# Patient Record
Sex: FEMALE | Race: BLACK OR AFRICAN AMERICAN | NOT HISPANIC OR LATINO | Employment: FULL TIME | URBAN - METROPOLITAN AREA
[De-identification: names, ages, dates, MRNs, and addresses within clinical notes are randomized per-mention and may not be internally consistent; named-entity substitution may affect disease eponyms.]

---

## 2022-10-13 ENCOUNTER — OFFICE VISIT (OUTPATIENT)
Dept: RHEUMATOLOGY | Facility: CLINIC | Age: 27
End: 2022-10-13
Payer: COMMERCIAL

## 2022-10-13 VITALS
OXYGEN SATURATION: 98 % | RESPIRATION RATE: 18 BRPM | TEMPERATURE: 99 F | WEIGHT: 206.19 LBS | SYSTOLIC BLOOD PRESSURE: 114 MMHG | DIASTOLIC BLOOD PRESSURE: 78 MMHG | HEIGHT: 65 IN | HEART RATE: 90 BPM | BODY MASS INDEX: 34.35 KG/M2

## 2022-10-13 DIAGNOSIS — M79.7 FIBROMYALGIA SYNDROME: ICD-10-CM

## 2022-10-13 DIAGNOSIS — F51.01 PRIMARY INSOMNIA: ICD-10-CM

## 2022-10-13 DIAGNOSIS — M54.89 INFLAMMATORY BACK PAIN: Primary | ICD-10-CM

## 2022-10-13 DIAGNOSIS — M77.9 ENTHESOPATHY: ICD-10-CM

## 2022-10-13 DIAGNOSIS — Q61.3 POLYCYSTIC KIDNEY DISEASE: ICD-10-CM

## 2022-10-13 PROCEDURE — 99999 PR PBB SHADOW E&M-NEW PATIENT-LVL IV: ICD-10-PCS | Mod: PBBFAC,,, | Performed by: INTERNAL MEDICINE

## 2022-10-13 PROCEDURE — 99204 PR OFFICE/OUTPT VISIT, NEW, LEVL IV, 45-59 MIN: ICD-10-PCS | Mod: S$GLB,,, | Performed by: INTERNAL MEDICINE

## 2022-10-13 PROCEDURE — 99204 OFFICE O/P NEW MOD 45 MIN: CPT | Mod: S$GLB,,, | Performed by: INTERNAL MEDICINE

## 2022-10-13 PROCEDURE — 1159F MED LIST DOCD IN RCRD: CPT | Mod: CPTII,S$GLB,, | Performed by: INTERNAL MEDICINE

## 2022-10-13 PROCEDURE — 3074F SYST BP LT 130 MM HG: CPT | Mod: CPTII,S$GLB,, | Performed by: INTERNAL MEDICINE

## 2022-10-13 PROCEDURE — 1159F PR MEDICATION LIST DOCUMENTED IN MEDICAL RECORD: ICD-10-PCS | Mod: CPTII,S$GLB,, | Performed by: INTERNAL MEDICINE

## 2022-10-13 PROCEDURE — 3078F PR MOST RECENT DIASTOLIC BLOOD PRESSURE < 80 MM HG: ICD-10-PCS | Mod: CPTII,S$GLB,, | Performed by: INTERNAL MEDICINE

## 2022-10-13 PROCEDURE — 99999 PR PBB SHADOW E&M-NEW PATIENT-LVL IV: CPT | Mod: PBBFAC,,, | Performed by: INTERNAL MEDICINE

## 2022-10-13 PROCEDURE — 3078F DIAST BP <80 MM HG: CPT | Mod: CPTII,S$GLB,, | Performed by: INTERNAL MEDICINE

## 2022-10-13 PROCEDURE — 3074F PR MOST RECENT SYSTOLIC BLOOD PRESSURE < 130 MM HG: ICD-10-PCS | Mod: CPTII,S$GLB,, | Performed by: INTERNAL MEDICINE

## 2022-10-13 RX ORDER — ERGOCALCIFEROL 1.25 MG/1
50000 CAPSULE ORAL
Qty: 15 CAPSULE | Refills: 5 | Status: SHIPPED | OUTPATIENT
Start: 2022-10-13 | End: 2023-01-13 | Stop reason: SDUPTHER

## 2022-10-13 RX ORDER — EPINEPHRINE 0.3 MG/.3ML
INJECTION SUBCUTANEOUS
COMMUNITY
Start: 2022-07-26

## 2022-10-13 RX ORDER — TIZANIDINE 4 MG/1
4 TABLET ORAL NIGHTLY
Qty: 30 TABLET | Refills: 5 | Status: SHIPPED | OUTPATIENT
Start: 2022-10-13 | End: 2023-01-13 | Stop reason: SDUPTHER

## 2022-10-13 RX ORDER — VENLAFAXINE 75 MG/1
75 TABLET ORAL 2 TIMES DAILY
COMMUNITY
Start: 2022-07-22 | End: 2023-04-21

## 2022-10-13 RX ORDER — ERGOCALCIFEROL 1.25 MG/1
1 CAPSULE ORAL DAILY
COMMUNITY
Start: 2022-06-27 | End: 2022-10-13 | Stop reason: SDUPTHER

## 2022-10-13 RX ORDER — LANOLIN ALCOHOL/MO/W.PET/CERES
100 CREAM (GRAM) TOPICAL DAILY
COMMUNITY

## 2022-10-13 RX ORDER — LUBIPROSTONE 24 UG/1
24 CAPSULE ORAL 2 TIMES DAILY
COMMUNITY
Start: 2022-10-03

## 2022-10-13 RX ORDER — ASCORBIC ACID 500 MG
500 TABLET ORAL DAILY
COMMUNITY
End: 2023-04-21

## 2022-10-13 RX ORDER — HYDROXYCHLOROQUINE SULFATE 200 MG/1
200 TABLET, FILM COATED ORAL 2 TIMES DAILY
Qty: 60 TABLET | Refills: 5 | Status: SHIPPED | OUTPATIENT
Start: 2022-10-13 | End: 2023-01-13 | Stop reason: SDUPTHER

## 2022-10-13 RX ORDER — BUDESONIDE AND FORMOTEROL FUMARATE DIHYDRATE 80; 4.5 UG/1; UG/1
2 AEROSOL RESPIRATORY (INHALATION) 2 TIMES DAILY
COMMUNITY
Start: 2020-07-05

## 2022-10-13 RX ORDER — LABETALOL 100 MG/1
100 TABLET, FILM COATED ORAL 2 TIMES DAILY
COMMUNITY
Start: 2022-07-09

## 2022-10-13 RX ORDER — BACLOFEN 10 MG/1
10 TABLET ORAL DAILY
COMMUNITY
Start: 2022-08-09 | End: 2022-10-13

## 2022-10-13 RX ORDER — PREDNISONE 5 MG/1
5 TABLET ORAL DAILY
Qty: 30 TABLET | Refills: 5 | Status: SHIPPED | OUTPATIENT
Start: 2022-10-13 | End: 2023-04-21

## 2022-10-13 RX ORDER — PREGABALIN 25 MG/1
25 CAPSULE ORAL 2 TIMES DAILY
Qty: 60 CAPSULE | Refills: 5 | Status: SHIPPED | OUTPATIENT
Start: 2022-10-13 | End: 2023-01-13 | Stop reason: SDUPTHER

## 2022-10-13 NOTE — PROGRESS NOTES
"Subjective:       Patient ID: Beverley Melendez is a 26 y.o. female.    Chief Complaint: New patient  (Referral from Dr. Kevin Rojas.. Polyarphropathy..)    Pt  is complaining of joint pain involving his MCP PIP wrist elbow shoulders hips knees and ankles bilaterally.  Is 10/10 in intensity dull in quality and continuous.  It is associated with a morning stiffness lasting for more than 60 minutes. Pt reports having difficulty maintaining a good night of sleep and this has been associated with myalgia of 10/10 in intensity.  This pain is dull continuous and gets worse mainly at night.  It is associated with fatigue.  No fever no chills no others.        Review of Systems   Constitutional:  Negative for appetite change, chills and fever.   HENT:  Negative for congestion, ear pain, mouth sores, nosebleeds and trouble swallowing.    Eyes:  Negative for photophobia and discharge.   Respiratory:  Negative for chest tightness and shortness of breath.    Cardiovascular:  Negative for chest pain.   Gastrointestinal:  Negative for abdominal pain and vomiting.   Endocrine: Negative.    Genitourinary:  Negative for hematuria.   Musculoskeletal:         As per HPI   Skin:  Negative for rash.   Neurological:  Negative for weakness.       Objective:   /78 (BP Location: Left arm, Patient Position: Sitting, BP Method: Large (Automatic))   Pulse 90   Temp 98.7 °F (37.1 °C) (Oral)   Resp 18   Ht 5' 5" (1.651 m)   Wt 93.5 kg (206 lb 3.2 oz)   LMP 09/19/2022 (Exact Date)   SpO2 98%   BMI 34.31 kg/m²      Physical Exam   Constitutional: She is oriented to person, place, and time. She appears well-developed and well-nourished. No distress.   HENT:   Head: Normocephalic and atraumatic.   Right Ear: External ear normal.   Left Ear: External ear normal.   Eyes: Pupils are equal, round, and reactive to light.   Cardiovascular: Normal rate, regular rhythm and normal heart sounds.   Pulmonary/Chest: Breath sounds normal. "   Abdominal: Soft. There is no abdominal tenderness.   Musculoskeletal:      Right shoulder: Tenderness present.      Left shoulder: Tenderness present.      Right elbow: Tenderness present.      Left elbow: Tenderness present.      Right wrist: Tenderness present.      Left wrist: Tenderness present.      Cervical back: Neck supple.      Right hip: Tenderness present.      Left hip: Tenderness present.      Right knee: Tenderness present.      Left knee: Tenderness present.      Right ankle: Tenderness present.      Left ankle: Tenderness present.   Lymphadenopathy:     She has no cervical adenopathy.   Neurological: She is alert and oriented to person, place, and time. She displays normal reflexes. No cranial nerve deficit or sensory deficit. She exhibits normal muscle tone. Coordination normal.   Skin: No rash noted. No erythema.   Vitals reviewed.      Right Side Rheumatological Exam     The patient is tender to palpation of the shoulder, elbow, wrist, knee, 1st PIP, 1st MCP, 2nd PIP, 2nd MCP, 3rd PIP, 3rd MCP, 4th PIP, 4th MCP, 5th PIP, hip, ankle, 1st MTP, 2nd MTP, 3rd MTP, 4th MTP, 5th MTP, 1st toe IP, 2nd toe IP, 3rd toe IP, 4th toe IP and 5th toe IP    Left Side Rheumatological Exam     The patient is tender to palpation of the shoulder, elbow, wrist, knee, 1st PIP, 1st MCP, 2nd PIP, 2nd MCP, 3rd PIP, 3rd MCP, 4th PIP, 4th MCP, 5th PIP, 5th MCP, hip, ankle, 1st MTP, 2nd MTP, 3rd MTP, 4th MTP, 5th MTP, 1st toe IP, 2nd toe IP, 3rd toe IP, 4th toe IP and 5th toe IP.       Completed Fibromyalgia exam 18/18 tender points.  No data to display     Assessment:       1. Inflammatory back pain    2. Fibromyalgia syndrome    3. Primary insomnia    4. Enthesopathy    5. Polycystic kidney disease            Plan:       Problem List Items Addressed This Visit    None  Visit Diagnoses       Inflammatory back pain    -  Primary    Relevant Medications    hydrOXYchloroQUINE (PLAQUENIL) 200 mg tablet    tiZANidine (ZANAFLEX)  4 MG tablet    predniSONE (DELTASONE) 5 MG tablet    pregabalin (LYRICA) 25 MG capsule    ergocalciferol (ERGOCALCIFEROL) 50,000 unit Cap    Other Relevant Orders    HLA B27 Antigen    CBC Auto Differential    Comprehensive Metabolic Panel    CRP, High Sensitivity    Vitamin D    Rheumatoid Quantitative    Cyclic Citrullinated Peptide Antibody, IgG    Antinuclear Ab, HEp-2 Substrate    Hepatitis B Surface Antigen    Hepatitis C Antibody    TSH    T4, Free    Anti-Neutrophilic Cytoplasmic Antibody    POCT Occult Blood Stool    Calprotectin, Stool    FECAL LEUKOCYTES - LACTOFERRIN ON  STOOL    Fibromyalgia syndrome        Relevant Medications    hydrOXYchloroQUINE (PLAQUENIL) 200 mg tablet    tiZANidine (ZANAFLEX) 4 MG tablet    predniSONE (DELTASONE) 5 MG tablet    pregabalin (LYRICA) 25 MG capsule    ergocalciferol (ERGOCALCIFEROL) 50,000 unit Cap    Other Relevant Orders    HLA B27 Antigen    CBC Auto Differential    Comprehensive Metabolic Panel    CRP, High Sensitivity    Vitamin D    Rheumatoid Quantitative    Cyclic Citrullinated Peptide Antibody, IgG    Antinuclear Ab, HEp-2 Substrate    Hepatitis B Surface Antigen    Hepatitis C Antibody    TSH    T4, Free    Anti-Neutrophilic Cytoplasmic Antibody    POCT Occult Blood Stool    Calprotectin, Stool    FECAL LEUKOCYTES - LACTOFERRIN ON  STOOL    Primary insomnia        Relevant Medications    hydrOXYchloroQUINE (PLAQUENIL) 200 mg tablet    tiZANidine (ZANAFLEX) 4 MG tablet    predniSONE (DELTASONE) 5 MG tablet    pregabalin (LYRICA) 25 MG capsule    ergocalciferol (ERGOCALCIFEROL) 50,000 unit Cap    Other Relevant Orders    HLA B27 Antigen    CBC Auto Differential    Comprehensive Metabolic Panel    CRP, High Sensitivity    Vitamin D    Rheumatoid Quantitative    Cyclic Citrullinated Peptide Antibody, IgG    Antinuclear Ab, HEp-2 Substrate    Hepatitis B Surface Antigen    Hepatitis C Antibody    TSH    T4, Free    Anti-Neutrophilic Cytoplasmic Antibody    POCT  Occult Blood Stool    Calprotectin, Stool    FECAL LEUKOCYTES - LACTOFERRIN ON  STOOL    Enthesopathy        Relevant Medications    hydrOXYchloroQUINE (PLAQUENIL) 200 mg tablet    tiZANidine (ZANAFLEX) 4 MG tablet    predniSONE (DELTASONE) 5 MG tablet    pregabalin (LYRICA) 25 MG capsule    ergocalciferol (ERGOCALCIFEROL) 50,000 unit Cap    Other Relevant Orders    HLA B27 Antigen    CBC Auto Differential    Comprehensive Metabolic Panel    CRP, High Sensitivity    Vitamin D    Rheumatoid Quantitative    Cyclic Citrullinated Peptide Antibody, IgG    Antinuclear Ab, HEp-2 Substrate    Hepatitis B Surface Antigen    Hepatitis C Antibody    TSH    T4, Free    Anti-Neutrophilic Cytoplasmic Antibody    POCT Occult Blood Stool    Calprotectin, Stool    FECAL LEUKOCYTES - LACTOFERRIN ON  STOOL    Polycystic kidney disease        Relevant Medications    hydrOXYchloroQUINE (PLAQUENIL) 200 mg tablet    tiZANidine (ZANAFLEX) 4 MG tablet    predniSONE (DELTASONE) 5 MG tablet    pregabalin (LYRICA) 25 MG capsule    ergocalciferol (ERGOCALCIFEROL) 50,000 unit Cap    Other Relevant Orders    HLA B27 Antigen    CBC Auto Differential    Comprehensive Metabolic Panel    CRP, High Sensitivity    Vitamin D    Rheumatoid Quantitative    Cyclic Citrullinated Peptide Antibody, IgG    Antinuclear Ab, HEp-2 Substrate    Hepatitis B Surface Antigen    Hepatitis C Antibody    TSH    T4, Free    Anti-Neutrophilic Cytoplasmic Antibody    POCT Occult Blood Stool    Calprotectin, Stool    FECAL LEUKOCYTES - LACTOFERRIN ON  STOOL

## 2022-10-14 ENCOUNTER — APPOINTMENT (OUTPATIENT)
Dept: LAB | Facility: HOSPITAL | Age: 27
End: 2022-10-14
Attending: INTERNAL MEDICINE
Payer: COMMERCIAL

## 2022-10-16 LAB
FECAL LEUKOCYTE (OHS): NEGATIVE
LEUKO NEG CONT (OHS): NEGATIVE
LEUKO POS CONT (OHS): POSITIVE

## 2022-10-18 LAB — CALPROTECTIN STL-MCNT: <50 MCG/G

## 2023-01-13 ENCOUNTER — OFFICE VISIT (OUTPATIENT)
Dept: RHEUMATOLOGY | Facility: CLINIC | Age: 28
End: 2023-01-13
Payer: MEDICAID

## 2023-01-13 VITALS
HEART RATE: 84 BPM | RESPIRATION RATE: 18 BRPM | WEIGHT: 208.38 LBS | TEMPERATURE: 98 F | DIASTOLIC BLOOD PRESSURE: 70 MMHG | SYSTOLIC BLOOD PRESSURE: 130 MMHG | HEIGHT: 65 IN | OXYGEN SATURATION: 97 % | BODY MASS INDEX: 34.72 KG/M2

## 2023-01-13 DIAGNOSIS — M77.9 ENTHESOPATHY: ICD-10-CM

## 2023-01-13 DIAGNOSIS — Q61.3 POLYCYSTIC KIDNEY DISEASE: ICD-10-CM

## 2023-01-13 DIAGNOSIS — M06.00 SERONEGATIVE RHEUMATOID ARTHRITIS: Primary | ICD-10-CM

## 2023-01-13 DIAGNOSIS — M79.7 FIBROMYALGIA SYNDROME: ICD-10-CM

## 2023-01-13 DIAGNOSIS — M54.89 INFLAMMATORY BACK PAIN: ICD-10-CM

## 2023-01-13 DIAGNOSIS — F51.01 PRIMARY INSOMNIA: ICD-10-CM

## 2023-01-13 PROCEDURE — 3008F BODY MASS INDEX DOCD: CPT | Mod: CPTII,,, | Performed by: INTERNAL MEDICINE

## 2023-01-13 PROCEDURE — 1159F PR MEDICATION LIST DOCUMENTED IN MEDICAL RECORD: ICD-10-PCS | Mod: CPTII,,, | Performed by: INTERNAL MEDICINE

## 2023-01-13 PROCEDURE — 3075F SYST BP GE 130 - 139MM HG: CPT | Mod: CPTII,,, | Performed by: INTERNAL MEDICINE

## 2023-01-13 PROCEDURE — 3078F DIAST BP <80 MM HG: CPT | Mod: CPTII,,, | Performed by: INTERNAL MEDICINE

## 2023-01-13 PROCEDURE — 99214 OFFICE O/P EST MOD 30 MIN: CPT | Mod: PBBFAC | Performed by: INTERNAL MEDICINE

## 2023-01-13 PROCEDURE — 99999 PR PBB SHADOW E&M-EST. PATIENT-LVL IV: ICD-10-PCS | Mod: PBBFAC,,, | Performed by: INTERNAL MEDICINE

## 2023-01-13 PROCEDURE — 1159F MED LIST DOCD IN RCRD: CPT | Mod: CPTII,,, | Performed by: INTERNAL MEDICINE

## 2023-01-13 PROCEDURE — 99999 PR PBB SHADOW E&M-EST. PATIENT-LVL IV: CPT | Mod: PBBFAC,,, | Performed by: INTERNAL MEDICINE

## 2023-01-13 PROCEDURE — 99214 PR OFFICE/OUTPT VISIT, EST, LEVL IV, 30-39 MIN: ICD-10-PCS | Mod: S$PBB,,, | Performed by: INTERNAL MEDICINE

## 2023-01-13 PROCEDURE — 99214 OFFICE O/P EST MOD 30 MIN: CPT | Mod: S$PBB,,, | Performed by: INTERNAL MEDICINE

## 2023-01-13 PROCEDURE — 3078F PR MOST RECENT DIASTOLIC BLOOD PRESSURE < 80 MM HG: ICD-10-PCS | Mod: CPTII,,, | Performed by: INTERNAL MEDICINE

## 2023-01-13 PROCEDURE — 3008F PR BODY MASS INDEX (BMI) DOCUMENTED: ICD-10-PCS | Mod: CPTII,,, | Performed by: INTERNAL MEDICINE

## 2023-01-13 PROCEDURE — 3075F PR MOST RECENT SYSTOLIC BLOOD PRESS GE 130-139MM HG: ICD-10-PCS | Mod: CPTII,,, | Performed by: INTERNAL MEDICINE

## 2023-01-13 RX ORDER — HYDROXYCHLOROQUINE SULFATE 200 MG/1
200 TABLET, FILM COATED ORAL 2 TIMES DAILY
Qty: 60 TABLET | Refills: 5 | Status: SHIPPED | OUTPATIENT
Start: 2023-01-13 | End: 2023-04-21 | Stop reason: SDUPTHER

## 2023-01-13 RX ORDER — PREGABALIN 50 MG/1
50 CAPSULE ORAL 2 TIMES DAILY
Qty: 60 CAPSULE | Refills: 5 | Status: SHIPPED | OUTPATIENT
Start: 2023-01-13 | End: 2023-04-21 | Stop reason: SDUPTHER

## 2023-01-13 RX ORDER — ERGOCALCIFEROL 1.25 MG/1
50000 CAPSULE ORAL
Qty: 15 CAPSULE | Refills: 5 | Status: SHIPPED | OUTPATIENT
Start: 2023-01-13 | End: 2023-04-21 | Stop reason: SDUPTHER

## 2023-01-13 RX ORDER — TIZANIDINE 4 MG/1
8 TABLET ORAL NIGHTLY
Qty: 60 TABLET | Refills: 5 | Status: SHIPPED | OUTPATIENT
Start: 2023-01-13 | End: 2023-04-21 | Stop reason: SDUPTHER

## 2023-01-13 NOTE — PROGRESS NOTES
"Subjective:       Patient ID: Beverley Melendez is a 27 y.o. female.    Chief Complaint: Follow-up (Bilateral leg pain )    The patient is complaining of joint pain involving the MCP PIP wrist elbow shoulders hips knees and ankles bilaterally.  The pain is 7/10 in intensity dull in quality and continuous.  That is associated with a morning stiffness lasting for more than 60 minutes.  Is also having difficulty maintaining a good night of sleep.  This has been associated with myalgias.  Muscle aches are 7/10 in intensity dull in quality and continuous.  They are associated with fatigue.  No fever no chills no others.    I CHECKED LABS TODAY DURING THE VISIT.       Review of Systems   Constitutional:  Negative for appetite change, chills and fever.   HENT:  Negative for congestion, ear pain, mouth sores, nosebleeds and trouble swallowing.    Eyes:  Negative for photophobia and discharge.   Respiratory:  Negative for chest tightness and shortness of breath.    Cardiovascular:  Negative for chest pain.   Gastrointestinal:  Negative for abdominal pain and vomiting.   Endocrine: Negative.    Genitourinary:  Negative for hematuria.   Musculoskeletal:         As per HPI   Skin:  Negative for rash.   Neurological:  Negative for weakness.       Objective:   /70 (BP Location: Left arm, Patient Position: Sitting, BP Method: Large (Manual))   Pulse 84   Temp 98.2 °F (36.8 °C) (Oral)   Resp 18   Ht 5' 5" (1.651 m)   Wt 94.5 kg (208 lb 6.4 oz)   LMP 12/21/2022 (Exact Date)   SpO2 97%   BMI 34.68 kg/m²      Physical Exam   Constitutional: She is oriented to person, place, and time. She appears well-developed and well-nourished. No distress.   HENT:   Head: Normocephalic and atraumatic.   Right Ear: External ear normal.   Left Ear: External ear normal.   Eyes: Pupils are equal, round, and reactive to light.   Cardiovascular: Normal rate, regular rhythm and normal heart sounds.   Pulmonary/Chest: Breath sounds normal. "   Abdominal: Soft. There is no abdominal tenderness.   Musculoskeletal:      Right shoulder: Tenderness present.      Left shoulder: Tenderness present.      Right elbow: Tenderness present.      Left elbow: Tenderness present.      Right wrist: Tenderness present.      Left wrist: Tenderness present.      Cervical back: Neck supple.      Right hip: Tenderness present.      Left hip: Tenderness present.      Right knee: Tenderness present.      Left knee: Tenderness present.      Right ankle: Tenderness present.      Left ankle: Tenderness present.   Lymphadenopathy:     She has no cervical adenopathy.   Neurological: She is alert and oriented to person, place, and time. She displays normal reflexes. No cranial nerve deficit or sensory deficit. She exhibits normal muscle tone. Coordination normal.   Skin: No rash noted. No erythema.   Vitals reviewed.      Right Side Rheumatological Exam     The patient is tender to palpation of the shoulder, elbow, wrist, knee, 1st PIP, 1st MCP, 2nd PIP, 2nd MCP, 3rd PIP, 3rd MCP, 4th PIP, 4th MCP, 5th PIP, hip, ankle, 1st MTP, 2nd MTP, 3rd MTP, 4th MTP, 5th MTP, 1st toe IP, 2nd toe IP, 3rd toe IP, 4th toe IP and 5th toe IP    Left Side Rheumatological Exam     The patient is tender to palpation of the shoulder, elbow, wrist, knee, 1st PIP, 1st MCP, 2nd PIP, 2nd MCP, 3rd PIP, 3rd MCP, 4th PIP, 4th MCP, 5th PIP, 5th MCP, hip, ankle, 1st MTP, 2nd MTP, 3rd MTP, 4th MTP, 5th MTP, 1st toe IP, 2nd toe IP, 3rd toe IP, 4th toe IP and 5th toe IP.       Completed Fibromyalgia exam 18/18 tender points.  No data to display     Assessment:       1. Seronegative rheumatoid arthritis    2. Fibromyalgia syndrome    3. Primary insomnia    4. Inflammatory back pain    5. Enthesopathy    6. Polycystic kidney disease            Medication List with Changes/Refills   Current Medications    ASCORBIC ACID, VITAMIN C, (VITAMIN C) 500 MG TABLET    Take 500 mg by mouth once daily.       Start Date: --         End Date: --    BUDESONIDE-FORMOTEROL 80-4.5 MCG (SYMBICORT) 80-4.5 MCG/ACTUATION HFAA    Inhale 2 puffs into the lungs 2 (two) times a day.       Start Date: 7/5/2020  End Date: --    CYANOCOBALAMIN (VITAMIN B-12) 1000 MCG TABLET    Take 100 mcg by mouth once daily.       Start Date: --        End Date: --    EPINEPHRINE (EPIPEN) 0.3 MG/0.3 ML ATIN           Start Date: 7/26/2022 End Date: --    LABETALOL (NORMODYNE) 100 MG TABLET    Take 100 mg by mouth 2 (two) times a day.       Start Date: 7/9/2022  End Date: --    LUBIPROSTONE (AMITIZA) 24 MCG CAP    Take 24 mcg by mouth 2 (two) times a day.       Start Date: 10/3/2022 End Date: --    NALTREXONE HCL, BULK, MISC    Take 1.5 mg by mouth every evening.       Start Date: 12/23/2022End Date: --    PREDNISONE (DELTASONE) 5 MG TABLET    Take 1 tablet (5 mg total) by mouth once daily. AFTER BREAKFAST       Start Date: 10/13/2022End Date: --    VENLAFAXINE (EFFEXOR) 75 MG TABLET    Take 75 mg by mouth 2 (two) times a day.       Start Date: 7/22/2022 End Date: --   Changed and/or Refilled Medications    Modified Medication Previous Medication    ERGOCALCIFEROL (ERGOCALCIFEROL) 50,000 UNIT CAP ergocalciferol (ERGOCALCIFEROL) 50,000 unit Cap       Take 1 capsule (50,000 Units total) by mouth 3 (three) times a week.    Take 1 capsule (50,000 Units total) by mouth 3 (three) times a week.       Start Date: 1/13/2023 End Date: --    Start Date: 10/13/2022End Date: 1/13/2023    HYDROXYCHLOROQUINE (PLAQUENIL) 200 MG TABLET hydrOXYchloroQUINE (PLAQUENIL) 200 mg tablet       Take 1 tablet (200 mg total) by mouth 2 (two) times daily. After food    Take 1 tablet (200 mg total) by mouth 2 (two) times daily. After food       Start Date: 1/13/2023 End Date: 7/16/2023    Start Date: 10/13/2022End Date: 1/13/2023    PREGABALIN (LYRICA) 50 MG CAPSULE pregabalin (LYRICA) 25 MG capsule       Take 1 capsule (50 mg total) by mouth 2 (two) times daily.    Take 1 capsule (25 mg total) by mouth  2 (two) times daily.       Start Date: 1/13/2023 End Date: 7/14/2023    Start Date: 10/13/2022End Date: 1/13/2023    TIZANIDINE (ZANAFLEX) 4 MG TABLET tiZANidine (ZANAFLEX) 4 MG tablet       Take 2 tablets (8 mg total) by mouth nightly.    Take 1 tablet (4 mg total) by mouth nightly.       Start Date: 1/13/2023 End Date: 7/12/2023    Start Date: 10/13/2022End Date: 1/13/2023         Plan:         Problem List Items Addressed This Visit    None  Visit Diagnoses       Seronegative rheumatoid arthritis    -  Primary    Relevant Medications    ergocalciferol (ERGOCALCIFEROL) 50,000 unit Cap    hydrOXYchloroQUINE (PLAQUENIL) 200 mg tablet    tiZANidine (ZANAFLEX) 4 MG tablet    pregabalin (LYRICA) 50 MG capsule    Fibromyalgia syndrome        Relevant Medications    ergocalciferol (ERGOCALCIFEROL) 50,000 unit Cap    hydrOXYchloroQUINE (PLAQUENIL) 200 mg tablet    tiZANidine (ZANAFLEX) 4 MG tablet    pregabalin (LYRICA) 50 MG capsule    Primary insomnia        Relevant Medications    ergocalciferol (ERGOCALCIFEROL) 50,000 unit Cap    hydrOXYchloroQUINE (PLAQUENIL) 200 mg tablet    tiZANidine (ZANAFLEX) 4 MG tablet    pregabalin (LYRICA) 50 MG capsule    Inflammatory back pain        Relevant Medications    ergocalciferol (ERGOCALCIFEROL) 50,000 unit Cap    hydrOXYchloroQUINE (PLAQUENIL) 200 mg tablet    tiZANidine (ZANAFLEX) 4 MG tablet    pregabalin (LYRICA) 50 MG capsule    Enthesopathy        Relevant Medications    ergocalciferol (ERGOCALCIFEROL) 50,000 unit Cap    hydrOXYchloroQUINE (PLAQUENIL) 200 mg tablet    tiZANidine (ZANAFLEX) 4 MG tablet    pregabalin (LYRICA) 50 MG capsule    Polycystic kidney disease        Relevant Medications    ergocalciferol (ERGOCALCIFEROL) 50,000 unit Cap    hydrOXYchloroQUINE (PLAQUENIL) 200 mg tablet    tiZANidine (ZANAFLEX) 4 MG tablet    pregabalin (LYRICA) 50 MG capsule

## 2023-04-04 ENCOUNTER — HOSPITAL ENCOUNTER (EMERGENCY)
Facility: HOSPITAL | Age: 28
Discharge: HOME | End: 2023-04-04
Attending: EMERGENCY MEDICINE
Payer: MEDICAID

## 2023-04-04 VITALS
TEMPERATURE: 99 F | OXYGEN SATURATION: 99 % | HEART RATE: 86 BPM | SYSTOLIC BLOOD PRESSURE: 140 MMHG | BODY MASS INDEX: 33.32 KG/M2 | WEIGHT: 200 LBS | HEIGHT: 65 IN | RESPIRATION RATE: 16 BRPM | DIASTOLIC BLOOD PRESSURE: 77 MMHG

## 2023-04-04 DIAGNOSIS — R10.9 ABDOMINAL PAIN, UNSPECIFIED ABDOMINAL LOCATION: Primary | ICD-10-CM

## 2023-04-04 DIAGNOSIS — R07.9 CHEST PAIN: ICD-10-CM

## 2023-04-04 LAB
ALBUMIN SERPL-MCNC: 3.8 G/DL (ref 3.5–5)
ALBUMIN/GLOB SERPL: 1.1 RATIO (ref 1.1–2)
ALP SERPL-CCNC: 52 UNIT/L (ref 40–150)
ALT SERPL-CCNC: 22 UNIT/L (ref 0–55)
AST SERPL-CCNC: 19 UNIT/L (ref 5–34)
B-HCG SERPL QL: NEGATIVE
BASOPHILS # BLD AUTO: 0.03 X10(3)/MCL (ref 0–0.2)
BASOPHILS NFR BLD AUTO: 0.6 %
BILIRUBIN DIRECT+TOT PNL SERPL-MCNC: 0.4 MG/DL
BUN SERPL-MCNC: 5.9 MG/DL (ref 7–18.7)
CALCIUM SERPL-MCNC: 9.2 MG/DL (ref 8.4–10.2)
CHLORIDE SERPL-SCNC: 108 MMOL/L (ref 98–107)
CO2 SERPL-SCNC: 27 MMOL/L (ref 22–29)
CREAT SERPL-MCNC: 0.96 MG/DL (ref 0.55–1.02)
EOSINOPHIL # BLD AUTO: 0.05 X10(3)/MCL (ref 0–0.9)
EOSINOPHIL NFR BLD AUTO: 1 %
ERYTHROCYTE [DISTWIDTH] IN BLOOD BY AUTOMATED COUNT: 13.6 % (ref 11.5–17)
FLUAV AG UPPER RESP QL IA.RAPID: NOT DETECTED
FLUBV AG UPPER RESP QL IA.RAPID: NOT DETECTED
GFR SERPLBLD CREATININE-BSD FMLA CKD-EPI: >60 MLS/MIN/1.73/M2
GLOBULIN SER-MCNC: 3.4 GM/DL (ref 2.4–3.5)
GLUCOSE SERPL-MCNC: 97 MG/DL (ref 74–100)
HCT VFR BLD AUTO: 36.1 % (ref 37–47)
HGB BLD-MCNC: 11.5 G/DL (ref 12–16)
IMM GRANULOCYTES # BLD AUTO: 0.01 X10(3)/MCL (ref 0–0.04)
IMM GRANULOCYTES NFR BLD AUTO: 0.2 %
LYMPHOCYTES # BLD AUTO: 2.29 X10(3)/MCL (ref 0.6–4.6)
LYMPHOCYTES NFR BLD AUTO: 45.3 %
MCH RBC QN AUTO: 31.2 PG (ref 27–31)
MCHC RBC AUTO-ENTMCNC: 31.9 G/DL (ref 33–36)
MCV RBC AUTO: 97.8 FL (ref 80–94)
MONOCYTES # BLD AUTO: 0.51 X10(3)/MCL (ref 0.1–1.3)
MONOCYTES NFR BLD AUTO: 10.1 %
NEUTROPHILS # BLD AUTO: 2.16 X10(3)/MCL (ref 2.1–9.2)
NEUTROPHILS NFR BLD AUTO: 42.8 %
NRBC BLD AUTO-RTO: 0 %
PLATELET # BLD AUTO: 347 X10(3)/MCL (ref 130–400)
PMV BLD AUTO: 9.2 FL (ref 7.4–10.4)
POTASSIUM SERPL-SCNC: 4.2 MMOL/L (ref 3.5–5.1)
PROT SERPL-MCNC: 7.2 GM/DL (ref 6.4–8.3)
RBC # BLD AUTO: 3.69 X10(6)/MCL (ref 4.2–5.4)
SARS-COV-2 RNA RESP QL NAA+PROBE: NOT DETECTED
SODIUM SERPL-SCNC: 141 MMOL/L (ref 136–145)
TROPONIN I SERPL-MCNC: <0.01 NG/ML (ref 0–0.04)
WBC # SPEC AUTO: 5.1 X10(3)/MCL (ref 4.5–11.5)

## 2023-04-04 PROCEDURE — 25000003 PHARM REV CODE 250: Performed by: NURSE PRACTITIONER

## 2023-04-04 PROCEDURE — 96361 HYDRATE IV INFUSION ADD-ON: CPT

## 2023-04-04 PROCEDURE — 63600175 PHARM REV CODE 636 W HCPCS: Performed by: NURSE PRACTITIONER

## 2023-04-04 PROCEDURE — 96372 THER/PROPH/DIAG INJ SC/IM: CPT | Performed by: PHYSICIAN ASSISTANT

## 2023-04-04 PROCEDURE — 85025 COMPLETE CBC W/AUTO DIFF WBC: CPT | Performed by: NURSE PRACTITIONER

## 2023-04-04 PROCEDURE — 96374 THER/PROPH/DIAG INJ IV PUSH: CPT

## 2023-04-04 PROCEDURE — 81025 URINE PREGNANCY TEST: CPT | Performed by: NURSE PRACTITIONER

## 2023-04-04 PROCEDURE — 93010 EKG 12-LEAD: ICD-10-PCS | Mod: ,,, | Performed by: INTERNAL MEDICINE

## 2023-04-04 PROCEDURE — 80053 COMPREHEN METABOLIC PANEL: CPT | Performed by: NURSE PRACTITIONER

## 2023-04-04 PROCEDURE — 25500020 PHARM REV CODE 255: Performed by: PHYSICIAN ASSISTANT

## 2023-04-04 PROCEDURE — 63600175 PHARM REV CODE 636 W HCPCS: Performed by: PHYSICIAN ASSISTANT

## 2023-04-04 PROCEDURE — 93010 ELECTROCARDIOGRAM REPORT: CPT | Mod: ,,, | Performed by: INTERNAL MEDICINE

## 2023-04-04 PROCEDURE — 84484 ASSAY OF TROPONIN QUANT: CPT | Performed by: NURSE PRACTITIONER

## 2023-04-04 PROCEDURE — 93005 ELECTROCARDIOGRAM TRACING: CPT

## 2023-04-04 PROCEDURE — 99285 EMERGENCY DEPT VISIT HI MDM: CPT | Mod: 25

## 2023-04-04 PROCEDURE — 0240U COVID/FLU A&B PCR: CPT | Performed by: NURSE PRACTITIONER

## 2023-04-04 RX ORDER — DICYCLOMINE HYDROCHLORIDE 10 MG/ML
20 INJECTION INTRAMUSCULAR
Status: COMPLETED | OUTPATIENT
Start: 2023-04-04 | End: 2023-04-04

## 2023-04-04 RX ORDER — ONDANSETRON 2 MG/ML
4 INJECTION INTRAMUSCULAR; INTRAVENOUS
Status: COMPLETED | OUTPATIENT
Start: 2023-04-04 | End: 2023-04-04

## 2023-04-04 RX ORDER — HYOSCYAMINE SULFATE 0.12 MG/1
0.12 TABLET SUBLINGUAL EVERY 6 HOURS PRN
Qty: 12 TABLET | Refills: 0 | Status: SHIPPED | OUTPATIENT
Start: 2023-04-04 | End: 2023-04-21

## 2023-04-04 RX ORDER — PROMETHAZINE HYDROCHLORIDE 25 MG/1
25 TABLET ORAL EVERY 4 HOURS
Qty: 12 TABLET | Refills: 0 | Status: SHIPPED | OUTPATIENT
Start: 2023-04-04 | End: 2023-04-21

## 2023-04-04 RX ADMIN — ONDANSETRON 4 MG: 2 INJECTION INTRAMUSCULAR; INTRAVENOUS at 01:04

## 2023-04-04 RX ADMIN — SODIUM CHLORIDE 1000 ML: 9 INJECTION, SOLUTION INTRAVENOUS at 12:04

## 2023-04-04 RX ADMIN — DICYCLOMINE HYDROCHLORIDE 20 MG: 20 INJECTION, SOLUTION INTRAMUSCULAR at 04:04

## 2023-04-04 RX ADMIN — IOPAMIDOL 100 ML: 755 INJECTION, SOLUTION INTRAVENOUS at 03:04

## 2023-04-04 NOTE — DISCHARGE INSTRUCTIONS
Take the medication as directed. Watch for worsening symptoms  Monitor for fever. Increase fluids over the next few days.

## 2023-04-04 NOTE — Clinical Note
"Beverley Brannon" Nora was seen and treated in our emergency department on 4/4/2023.  She may return to work on 04/10/2023.       If you have any questions or concerns, please don't hesitate to call.      Anne-Marie Stearns PA-C"

## 2023-04-04 NOTE — FIRST PROVIDER EVALUATION
Medical screening examination initiated.  I have conducted a focused provider triage encounter, findings are as follows:    Brief history of present illness:  26 y/o female who presents with nausea/vomiting, chest pain, congestion. Seen yesterday at [facility] and had swabs done which were negative. Has nausea meds from [facility] which she didn't take today because it wasn't helping her yesterday.     There were no vitals filed for this visit.    Pertinent physical exam:  alert, nonlabored, ambulatory     Brief workup plan:  ekg, imaging, meds    Preliminary workup initiated; this workup will be continued and followed by the physician or advanced practice provider that is assigned to the patient when roomed.

## 2023-04-04 NOTE — ED PROVIDER NOTES
Encounter Date: 4/4/2023       History     Chief Complaint   Patient presents with    Chest Pain     POV: complains of CP, emesis, chills, cough. Seen at [facility] 2 days ago, tested for COVID/flu/strep; all negative. PMH: RA, EDS, PCOS. CARD: Dr Lynn in [place]; has not contacted them. Did not take nausea meds RX by [facility] today because they were not helping per pt.     27 year old bf with RA, EDS, PCOS presents with worsening emesis, chills, periumbilical pain and cough for three days. Patient was seen at outlying [facility] and had covid/flu/strep performed all of which were negative. Patient denies any recorded fever. Patient states [facility] gave her zofran to help and it is not improving her symptoms. Patient states her pain feels different than normal IBS flares.     The history is provided by the patient. No  was used.   Emesis   This is a recurrent problem. The current episode started several days ago. The problem occurs 2 - 4 times per day. The problem has been waxing and waning. The emesis has an appearance of stomach contents. Associated symptoms include abdominal pain, chills and cough. Pertinent negatives include no diarrhea and no fever.   Review of patient's allergies indicates:   Allergen Reactions    Antihistamines - alkylamine      Other reaction(s): Rash or Itch    Shellfish containing products      Other reaction(s): Other (See Comments)  Rash, itching       Past Medical History:   Diagnosis Date    Anemia     Anxiety     Arthritis     Asthma     Depression     Dysautonomia     EDS (Adrianna-Danlos syndrome)     Irritable bowel syndrome     PCOS (polycystic ovarian syndrome)     Polycystic kidney disease     Sinus tachycardia      Past Surgical History:   Procedure Laterality Date    EXTRACTION OF TOOTH  01/2017    LAPAROSCOPY  07/2019    1st time looked , 2nd time 05/2002 removal/cyst right ovary    SCOLIOSIS REPAIR  07/2005    x2 02/2010    SPINE SURGERY  06/2005 02/2011    WISDOM TOOTH  EXTRACTION  04/2011     Family History   Problem Relation Age of Onset    Diabetes Mellitus Mother     Hypertension Mother     Heart disease Mother     Myocarditis Mother     Arthritis Mother     Asthma Mother     Miscarriages / Stillbirths Mother     Hypertension Father     Kidney disease Father     Mental illness Father     Arthritis Father     Depression Father     Drug abuse Father     Hypertension Maternal Grandmother     Diabetes Mellitus Maternal Grandmother     Arthritis Maternal Grandmother     Diabetes Maternal Grandmother     COPD Maternal Grandfather     Diabetes Maternal Grandfather     Heart disease Maternal Grandfather     Hypertension Maternal Grandfather     Stroke Maternal Grandfather     Heart disease Paternal Grandfather     Cancer Paternal Grandfather     Diabetes Paternal Grandfather     Hypertension Paternal Grandfather     Stroke Paternal Grandfather     Hypertension Maternal Aunt     Hypertension Maternal Uncle     Kidney disease Paternal Uncle      Social History     Tobacco Use    Smoking status: Never     Passive exposure: Never    Smokeless tobacco: Never   Substance Use Topics    Alcohol use: Yes     Alcohol/week: 2.0 standard drinks     Types: 1 Glasses of wine, 1 Shots of liquor per week     Comment: occas    Drug use: Never     Review of Systems   Constitutional:  Positive for chills. Negative for fever.   HENT:  Negative for sore throat.    Respiratory:  Positive for cough. Negative for shortness of breath.    Cardiovascular:  Negative for chest pain.   Gastrointestinal:  Positive for abdominal pain and vomiting. Negative for diarrhea and nausea.   Genitourinary:  Negative for dysuria.   Musculoskeletal:  Negative for back pain.   Skin:  Negative for rash.   Neurological:  Negative for weakness.   Hematological:  Does not bruise/bleed easily.     Physical Exam     Initial Vitals [04/04/23 1207]   BP Pulse Resp Temp SpO2   (!) 140/77 86 16 98.6 °F (37 °C) 99 %      MAP       --          Physical Exam    Nursing note and vitals reviewed.  Constitutional: She appears well-developed and well-nourished.   HENT:   Head: Normocephalic and atraumatic.   Eyes: Pupils are equal, round, and reactive to light.   Cardiovascular:  Normal rate, regular rhythm and normal heart sounds.           Pulmonary/Chest: Breath sounds normal.   Abdominal: Abdomen is soft. Bowel sounds are normal.   Musculoskeletal:         General: Normal range of motion.     Neurological: She is alert and oriented to person, place, and time.   Skin: Skin is warm.       ED Course   Procedures  Labs Reviewed   CBC WITH DIFFERENTIAL - Abnormal; Notable for the following components:       Result Value    RBC 3.69 (*)     Hgb 11.5 (*)     Hct 36.1 (*)     MCV 97.8 (*)     MCH 31.2 (*)     MCHC 31.9 (*)     All other components within normal limits   COMPREHENSIVE METABOLIC PANEL - Abnormal; Notable for the following components:    Chloride 108 (*)     Blood Urea Nitrogen 5.9 (*)     All other components within normal limits   TROPONIN I - Normal   COVID/FLU A&B PCR - Normal    Narrative:     The Xpert Xpress SARS-CoV-2/FLU/RSV plus is a rapid, multiplexed real-time PCR test intended for the simultaneous qualitative detection and differentiation of SARS-CoV-2, Influenza A, Influenza B, and respiratory syncytial virus (RSV) viral RNA in either nasopharyngeal swab or nasal swab specimens.         PREGNANCY TEST, URINE RAPID - Normal     EKG Readings: (Independently Interpreted)   Initial Reading: No STEMI. Rhythm: Normal Sinus Rhythm. Heart Rate: 73. Conduction: Normal.   ECG Results              EKG 12-lead (Final result)  Result time 04/04/23 14:04:49      Final result by Interface, Lab In [facility] (04/04/23 14:04:49)                   Narrative:    Test Reason : R07.9,    Vent. Rate : 073 BPM     Atrial Rate : 073 BPM     P-R Int : 138 ms          QRS Dur : 074 ms      QT Int : 360 ms       P-R-T Axes : 078 058 043 degrees     QTc Int : 396  ms    Normal sinus rhythm  Normal ECG  No previous ECGs available  Confirmed by Simon Bull MD ([id number]) on 4/4/2023 2:04:31 PM    Referred By: [email or web address]   SELF           Confirmed By:Simon Bull MD                                  Imaging Results              CT Abdomen Pelvis With Contrast (Final result)  Result time 04/04/23 15:57:30      Final result by Mike Griggs MD (04/04/23 15:57:30)                   Impression:      Small volume pelvic ascites is within physiologic limits.  Otherwise, no acute process identified.    Polycystic kidneys and hepatic steatosis.      Electronically signed by: Mike Griggs  Date:    04/04/2023  Time:    15:57               Narrative:    EXAMINATION:  CT ABDOMEN PELVIS WITH CONTRAST    CLINICAL HISTORY:  Abdominal pain, acute, nonlocalized;    TECHNIQUE:  CT imaging of the abdomen and pelvis after intravenous contrast. Dose length product 672 mGycm. Automatic exposure control, adjustment of mA/kV or iterative reconstruction technique used to limit radiation dose.    COMPARISON:  No relevant comparison studies available at the time of dictation.    FINDINGS:  Liver/biliary: Mild generalized hepatic steatosis.  No radiodense gallstones. No intra or extrahepatic biliary ductal dilation.    Pancreas: Normal.    Spleen: Normal.    Adrenals: Normal.    Genitourinary: Polycystic kidneys with largest cyst measuring 22 mm in transverse diameter.  No hydronephrosis.  Bladder within normal limits.    Stomach/bowel: No evidence of bowel obstruction. Normal appendix.    Lymph nodes/peritoneum: No pathologically enlarged lymph node identified. Small volume pelvic ascites.    Vasculature: Normal abdominal aortic caliber.    Abdominal wall: Normal.    Lung bases: No consolidation or pleural effusion.    Musculoskeletal: No acute osseous findings.                                       X-Ray Chest PA And Lateral (Final result)  Result time 04/04/23 12:23:06      Final result by Gareth Elkins,  MD (04/04/23 12:23:06)                   Impression:      No acute cardiopulmonary abnormality.      Electronically signed by: Gareth Elkins  Date:    04/04/2023  Time:    12:23               Narrative:    EXAMINATION:  XR CHEST PA AND LATERAL    CLINICAL HISTORY:  chest pain;    COMPARISON:  No priors    FINDINGS:  PA and lateral views of the chest were obtained. Heart and mediastinum within normal limits. The lungs are clear. No pneumothorax or significant effusion.  There is moderate dextroscoliosis.                                       Medications   ondansetron injection 4 mg (4 mg Intravenous Given 4/4/23 1344)   sodium chloride 0.9% bolus 1,000 mL 1,000 mL (0 mLs Intravenous Stopped 4/4/23 1315)   iopamidoL (ISOVUE-370) injection 100 mL (100 mLs Intravenous Given 4/4/23 1542)        Additional MDM:   Differential Diagnosis:   Other: The following diagnoses were also considered and will be evaluated: Colitis, Viral Gastroenteritis. Patients labs and imaging are within normal limits. Informed patient her symptoms are likely related to viral process. As she is on Plaquenil it may be more strenuous on her body to recover from certain viruses. Have instructed her to rest, drink fluids and monitor fevers and symptoms.                         Clinical Impression:   Final diagnoses:  [R07.9] Chest pain  [R10.9] Abdominal pain, unspecified abdominal location (Primary)        ED Disposition Condition    Discharge Stable          ED Prescriptions       Medication Sig Dispense Start Date End Date Auth. Provider    promethazine (PHENERGAN) 25 MG tablet Take 1 tablet (25 mg total) by mouth every 4 (four) hours. 12 tablet 4/4/2023 -- Anne-Marie Stearns PA-C    hyoscyamine (LEVSIN/SL) 0.125 mg Subl Place 1 tablet (0.125 mg total) under the tongue every 6 (six) hours as needed (abd cramps). 12 tablet 4/4/2023 -- Anne-Marie Stearns PA-C          Follow-up Information       Follow up With Specialties Details Why Contact Info    Kevin GAITAN  MD [facility], Internal Medicine   [address] [place] [id number]  [phone]               Anne-Marie Stearns PA-C  04/04/23 2625

## 2023-04-20 PROBLEM — G47.00 INSOMNIA: Status: ACTIVE | Noted: 2023-04-20

## 2023-04-20 PROBLEM — M06.00 SERONEGATIVE RHEUMATOID ARTHRITIS: Status: ACTIVE | Noted: 2023-04-20

## 2023-04-20 PROBLEM — M77.9 ENTHESOPATHY: Status: ACTIVE | Noted: 2023-04-20

## 2023-04-20 PROBLEM — M79.7 FIBROMYALGIA: Status: ACTIVE | Noted: 2023-04-20

## 2023-04-20 PROBLEM — Q61.3 POLYCYSTIC KIDNEY DISEASE: Status: ACTIVE | Noted: 2023-04-20

## 2023-04-20 NOTE — ASSESSMENT & PLAN NOTE
Continue hydroxychloroquine 200 mg b.i.d.  Start Sulfasalzine 1000mg daily after supper    Plaquenil Eye Exam: [facility] March 2023

## 2023-04-20 NOTE — PROGRESS NOTES
Subjective:           Patient ID: Beverley Melendez is a 27 y.o. female.    Chief Complaint: Follow-up (Generalized pain )      Ms. Melendez is a 27-year-old female here for follow-up.  She was initially referred by Dr. Zofia Rojas for polyarthropathy.  She established care with Dr. Young on 10/13/2022. Past labs: MAYE Neg, c-ANCA, p-ANCA neg, RF Neg, CCP neg, HLA-B27 negative. She was initiated on Hydroxychloroquine. At her last office visit Lyrica was increased from 25 mg BID to 50 mg BID. She is of childbearing age and trying to have a baby. She reports daily numbness/tingling in bilateral feet. Followed by Cardiology, Sinus A flutter, Followed by EDS- Jey. Followed by neurologist- Dr. Ayala Tremors in hand and migraines.   Today The patient is complaining of joint pain involving the MCP PIP wrist elbow shoulders hips knees and ankles bilaterally.  The pain is 9/10 in intensity dull in quality and continuous.  That is associated with a morning stiffness lasting for more than 60 minutes.  I This has been associated with myalgias.  Muscle aches are 6/10 in intensity dull in quality and continuous.  They are associated with fatigue.  Denies fever and chills.         Review of Systems   Constitutional:  Positive for fatigue. Negative for appetite change, chills and fever.   HENT:  Negative for congestion, ear pain, mouth sores, nosebleeds and trouble swallowing.    Eyes:  Negative for photophobia and discharge.   Respiratory:  Negative for chest tightness and shortness of breath.    Cardiovascular:  Negative for chest pain.   Gastrointestinal:  Negative for abdominal pain and vomiting.   Endocrine: Negative.    Genitourinary:  Negative for hematuria.   Musculoskeletal:         As per HPI   Skin:  Negative for rash.   Neurological:  Negative for weakness.       Objective:   /76 (BP Location: Left arm, Patient Position: Sitting, BP Method: Large (Automatic))   Pulse 69   Temp 98.7 °F (37.1 °C) (Oral)   Resp  "18   Ht 5' 5" (1.651 m)   Wt 92.4 kg (203 lb 12.8 oz)   LMP 04/20/2023 (Exact Date)   SpO2 99%   BMI 33.91 kg/m²          Physical Exam   Constitutional: She is oriented to person, place, and time. She appears well-developed and well-nourished. No distress.   HENT:   Head: Normocephalic and atraumatic.   Right Ear: External ear normal.   Left Ear: External ear normal.   Eyes: Pupils are equal, round, and reactive to light.   Cardiovascular: Normal rate.   Pulmonary/Chest: Effort normal.   Abdominal: Soft. There is no abdominal tenderness.   Musculoskeletal:      Right shoulder: Tenderness present.      Left shoulder: Tenderness present.      Right elbow: Tenderness present.      Left elbow: Tenderness present.      Right wrist: Tenderness present.      Left wrist: Tenderness present.      Cervical back: Neck supple.      Right hip: Tenderness present.      Left hip: Tenderness present.      Right knee: Tenderness present.      Left knee: Tenderness present.      Right ankle: Tenderness present.      Left ankle: Tenderness present.   Lymphadenopathy:     She has no cervical adenopathy.   Neurological: She is alert and oriented to person, place, and time. She displays normal reflexes. No cranial nerve deficit or sensory deficit. She exhibits normal muscle tone. Coordination normal.   Skin: No rash noted. No erythema.   Vitals reviewed.      Right Side Rheumatological Exam     The patient is tender to palpation of the shoulder, elbow, wrist, knee, 1st PIP, 1st MCP, 2nd PIP, 2nd MCP, 3rd PIP, 3rd MCP, 4th PIP, 4th MCP, 5th PIP, hip, ankle, 1st MTP, 2nd MTP, 3rd MTP, 4th MTP, 5th MTP, 1st toe IP, 2nd toe IP, 3rd toe IP, 4th toe IP and 5th toe IP    Left Side Rheumatological Exam     The patient is tender to palpation of the shoulder, elbow, wrist, knee, 1st PIP, 1st MCP, 2nd PIP, 2nd MCP, 3rd PIP, 3rd MCP, 4th PIP, 4th MCP, 5th PIP, 5th MCP, hip, ankle, 1st MTP, 2nd MTP, 3rd MTP, 4th MTP, 5th MTP, 1st toe IP, 2nd " toe IP, 3rd toe IP, 4th toe IP and 5th toe IP.         Completed Fibromyalgia exam 18/18 tender points.    No data to display     Assessment:         Medication List with Changes/Refills   New Medications    SULFASALAZINE (AZULFIDINE) 500 MG TAB    Take 2 tablets (1,000 mg total) by mouth daily with dinner or evening meal.       Start Date: 4/21/2023 End Date: 10/18/2023   Current Medications    BUDESONIDE-FORMOTEROL 80-4.5 MCG (SYMBICORT) 80-4.5 MCG/ACTUATION HFAA    Inhale 2 puffs into the lungs 2 (two) times a day.       Start Date: 7/5/2020  End Date: --    CLINDAMYCIN (CLEOCIN T) 1 % EXTERNAL SOLUTION    Apply 30 mLs topically 2 (two) times daily as needed.       Start Date: 3/16/2023 End Date: --    CLOBETASOL (TEMOVATE) 0.05 % CREAM    Apply 45 g topically 2 (two) times daily.       Start Date: 3/23/2023 End Date: --    CYANOCOBALAMIN (VITAMIN B-12) 1000 MCG TABLET    Take 100 mcg by mouth once daily.       Start Date: --        End Date: --    EPINEPHRINE (EPIPEN) 0.3 MG/0.3 ML ATIN           Start Date: 7/26/2022 End Date: --    FAMOTIDINE (PEPCID) 40 MG TABLET    Take 40 mg by mouth every evening.       Start Date: 3/16/2023 End Date: --    FEROSUL 325 MG (65 MG IRON) TAB TABLET    Take 65 mg by mouth 2 (two) times daily.       Start Date: 3/28/2023 End Date: --    KETOCONAZOLE (NIZORAL) 2 % SHAMPOO    Apply 120 mLs topically twice a week.       Start Date: 3/16/2023 End Date: --    LABETALOL (NORMODYNE) 100 MG TABLET    Take 100 mg by mouth 2 (two) times a day.       Start Date: 7/9/2022  End Date: --    LUBIPROSTONE (AMITIZA) 24 MCG CAP    Take 24 mcg by mouth 2 (two) times a day.       Start Date: 10/3/2022 End Date: --    NALTREXONE HCL, BULK, MISC    Take 1.5 mg by mouth every evening.       Start Date: 12/23/2022End Date: --    ONDANSETRON (ZOFRAN) 8 MG TABLET    Take 8 mg by mouth every 8 (eight) hours as needed.       Start Date: 10/11/2022End Date: --    PANTOPRAZOLE (PROTONIX) 40 MG TABLET     Take 40 mg by mouth once daily.       Start Date: 12/12/2022End Date: --    PREGABALIN (LYRICA) 50 MG CAPSULE    Take 1 capsule (50 mg total) by mouth 2 (two) times daily.       Start Date: 1/13/2023 End Date: 7/14/2023    PRIMIDONE (MYSOLINE) 50 MG TAB    Take 0.5 mg by mouth every evening.       Start Date: 2/16/2023 End Date: --    TRANEXAMIC ACID (LYSTEDA) 650 MG TABLET    Take 1,300 mg by mouth 3 (three) times daily.       Start Date: 3/23/2023 End Date: --    VENTOLIN HFA 90 MCG/ACTUATION INHALER    Inhale 2 puffs into the lungs every 6 (six) hours as needed.       Start Date: 11/4/2022 End Date: --   Changed and/or Refilled Medications    Modified Medication Previous Medication    ERGOCALCIFEROL (ERGOCALCIFEROL) 50,000 UNIT CAP ergocalciferol (ERGOCALCIFEROL) 50,000 unit Cap       Take 1 capsule (50,000 Units total) by mouth every 7 days.    Take 1 capsule (50,000 Units total) by mouth 3 (three) times a week.       Start Date: 4/21/2023 End Date: --    Start Date: 1/13/2023 End Date: 4/21/2023    HYDROXYCHLOROQUINE (PLAQUENIL) 200 MG TABLET hydrOXYchloroQUINE (PLAQUENIL) 200 mg tablet       Take 1 tablet (200 mg total) by mouth 2 (two) times daily. After food    Take 1 tablet (200 mg total) by mouth 2 (two) times daily. After food       Start Date: 4/21/2023 End Date: 10/22/2023    Start Date: 1/13/2023 End Date: 4/21/2023    TIZANIDINE (ZANAFLEX) 4 MG TABLET tiZANidine (ZANAFLEX) 4 MG tablet       Take 2 tablets (8 mg total) by mouth nightly.    Take 2 tablets (8 mg total) by mouth nightly.       Start Date: 4/21/2023 End Date: 10/18/2023    Start Date: 1/13/2023 End Date: 4/21/2023   Discontinued Medications    ASCORBIC ACID, VITAMIN C, (VITAMIN C) 500 MG TABLET    Take 500 mg by mouth once daily.       Start Date: --        End Date: 4/21/2023    HYOSCYAMINE (LEVSIN/SL) 0.125 MG SUBL    Place 1 tablet (0.125 mg total) under the tongue every 6 (six) hours as needed (abd cramps).       Start Date: 4/4/2023   End Date: 4/21/2023    PREDNISONE (DELTASONE) 5 MG TABLET    Take 1 tablet (5 mg total) by mouth once daily. AFTER BREAKFAST       Start Date: 10/13/2022End Date: 4/21/2023    PROMETHAZINE (PHENERGAN) 25 MG TABLET    Take 1 tablet (25 mg total) by mouth every 4 (four) hours.       Start Date: 4/4/2023  End Date: 4/21/2023    VENLAFAXINE (EFFEXOR) 75 MG TABLET    Take 75 mg by mouth 2 (two) times a day.       Start Date: 7/22/2022 End Date: 4/21/2023         ICD-10-CM ICD-9-CM   1. Seronegative rheumatoid arthritis  M06.00 714.0   2. Fibromyalgia  M79.7 729.1   3. Insomnia, unspecified type  G47.00 780.52   4. Enthesopathy  M77.9 726.90   5. Iron deficiency anemia, unspecified iron deficiency anemia type  D50.9 280.9   6. Vitamin D deficiency  E55.9 268.9           Plan:       1. Seronegative rheumatoid arthritis  Overview:  Past labs: MAYE Neg, c-ANCA, p-ANCA neg, RF Neg, CCP neg, HLA-B27 negative.  Note to use pregnancy safe medications. Childbearing age and trying to have a baby-     Assessment & Plan:  Continue hydroxychloroquine 200 mg b.i.d.  Start Sulfasalzine 1000mg daily after supper    Plaquenil Eye Exam: [facility] March 2023    Orders:  -     sulfaSALAzine (AZULFIDINE) 500 mg Tab; Take 2 tablets (1,000 mg total) by mouth daily with dinner or evening meal.  Dispense: 60 tablet; Refill: 5  -     hydrOXYchloroQUINE (PLAQUENIL) 200 mg tablet; Take 1 tablet (200 mg total) by mouth 2 (two) times daily. After food  Dispense: 60 tablet; Refill: 5    2. Fibromyalgia  Assessment & Plan:  Numbness and Tingling daily in bilateral feet.   Increase Lyrica from 50 mg BID to 75 mg BID  Continue tizanidine 8 mg daily    Orders:  -     tiZANidine (ZANAFLEX) 4 MG tablet; Take 2 tablets (8 mg total) by mouth nightly.  Dispense: 60 tablet; Refill: 5    3. Insomnia, unspecified type  Assessment & Plan:  Avoid caffeine, alcohol and stimulants.  Sleep hygiene refers to actions that tend to improve and maintain good  sleep:  Sleep as long as necessary to feel rested (usually seven to eight hours for adults) and then get out of bed  Maintain a regular sleep schedule, particularly a regular wake-up time in the morning  Avoid smoking or other nicotine intake, particularly during the evening          4. Enthesopathy    5. Iron deficiency anemia, unspecified iron deficiency anemia type  Assessment & Plan:  Reports she is on iron supplementation for her anemia      6. Vitamin D deficiency  Assessment & Plan:  Continue Ergocalciferol  50,000 units Q 7 days  Educated on increasing foods high in Vitamin D such as fish oil, cod liver oil, salmon, milk fortified with vitamin D.        Orders:  -     ergocalciferol (ERGOCALCIFEROL) 50,000 unit Cap; Take 1 capsule (50,000 Units total) by mouth every 7 days.  Dispense: 5 capsule; Refill: 5         I, Dr Young, went into the patient's room and interviewed and examined the patient.

## 2023-04-20 NOTE — ASSESSMENT & PLAN NOTE
Numbness and Tingling daily in bilateral feet.   Increase Lyrica from 50 mg BID to 75 mg BID  Continue tizanidine 8 mg daily   Itraconazole Counseling:  I discussed with the patient the risks of itraconazole including but not limited to liver damage, nausea/vomiting, neuropathy, and severe allergy.  The patient understands that this medication is best absorbed when taken with acidic beverages such as non-diet cola or ginger ale.  The patient understands that monitoring is required including baseline LFTs and repeat LFTs at intervals.  The patient understands that they are to contact us or the primary physician if concerning signs are noted.

## 2023-04-21 ENCOUNTER — OFFICE VISIT (OUTPATIENT)
Dept: RHEUMATOLOGY | Facility: CLINIC | Age: 28
End: 2023-04-21
Payer: MEDICAID

## 2023-04-21 VITALS
DIASTOLIC BLOOD PRESSURE: 76 MMHG | HEIGHT: 65 IN | RESPIRATION RATE: 18 BRPM | WEIGHT: 203.81 LBS | OXYGEN SATURATION: 99 % | TEMPERATURE: 99 F | BODY MASS INDEX: 33.96 KG/M2 | SYSTOLIC BLOOD PRESSURE: 120 MMHG | HEART RATE: 69 BPM

## 2023-04-21 DIAGNOSIS — E55.9 VITAMIN D DEFICIENCY: ICD-10-CM

## 2023-04-21 DIAGNOSIS — D50.9 IRON DEFICIENCY ANEMIA, UNSPECIFIED IRON DEFICIENCY ANEMIA TYPE: ICD-10-CM

## 2023-04-21 DIAGNOSIS — G47.00 INSOMNIA, UNSPECIFIED TYPE: ICD-10-CM

## 2023-04-21 DIAGNOSIS — M79.7 FIBROMYALGIA: ICD-10-CM

## 2023-04-21 DIAGNOSIS — M06.00 SERONEGATIVE RHEUMATOID ARTHRITIS: ICD-10-CM

## 2023-04-21 DIAGNOSIS — M77.9 ENTHESOPATHY: ICD-10-CM

## 2023-04-21 PROCEDURE — 3074F PR MOST RECENT SYSTOLIC BLOOD PRESSURE < 130 MM HG: ICD-10-PCS | Mod: CPTII,,,

## 2023-04-21 PROCEDURE — 3008F BODY MASS INDEX DOCD: CPT | Mod: CPTII,,,

## 2023-04-21 PROCEDURE — 1159F MED LIST DOCD IN RCRD: CPT | Mod: CPTII,,,

## 2023-04-21 PROCEDURE — 99999 PR PBB SHADOW E&M-EST. PATIENT-LVL V: ICD-10-PCS | Mod: PBBFAC,,,

## 2023-04-21 PROCEDURE — 99999 PR PBB SHADOW E&M-EST. PATIENT-LVL V: CPT | Mod: PBBFAC,,,

## 2023-04-21 PROCEDURE — 1159F PR MEDICATION LIST DOCUMENTED IN MEDICAL RECORD: ICD-10-PCS | Mod: CPTII,,,

## 2023-04-21 PROCEDURE — 3074F SYST BP LT 130 MM HG: CPT | Mod: CPTII,,,

## 2023-04-21 PROCEDURE — 3078F PR MOST RECENT DIASTOLIC BLOOD PRESSURE < 80 MM HG: ICD-10-PCS | Mod: CPTII,,,

## 2023-04-21 PROCEDURE — 99214 OFFICE O/P EST MOD 30 MIN: CPT | Mod: S$PBB,,,

## 2023-04-21 PROCEDURE — 3078F DIAST BP <80 MM HG: CPT | Mod: CPTII,,,

## 2023-04-21 PROCEDURE — 99214 PR OFFICE/OUTPT VISIT, EST, LEVL IV, 30-39 MIN: ICD-10-PCS | Mod: S$PBB,,,

## 2023-04-21 PROCEDURE — 3008F PR BODY MASS INDEX (BMI) DOCUMENTED: ICD-10-PCS | Mod: CPTII,,,

## 2023-04-21 PROCEDURE — 99215 OFFICE O/P EST HI 40 MIN: CPT | Mod: PBBFAC

## 2023-04-21 RX ORDER — CLOBETASOL PROPIONATE 0.5 MG/G
45 CREAM TOPICAL 2 TIMES DAILY
COMMUNITY
Start: 2023-03-23

## 2023-04-21 RX ORDER — TRANEXAMIC ACID 650 MG/1
1300 TABLET ORAL 3 TIMES DAILY
COMMUNITY
Start: 2023-03-23

## 2023-04-21 RX ORDER — ONDANSETRON HYDROCHLORIDE 8 MG/1
8 TABLET, FILM COATED ORAL EVERY 8 HOURS PRN
COMMUNITY
Start: 2022-10-11

## 2023-04-21 RX ORDER — HYDROXYCHLOROQUINE SULFATE 200 MG/1
200 TABLET, FILM COATED ORAL 2 TIMES DAILY
Qty: 60 TABLET | Refills: 5 | Status: SHIPPED | OUTPATIENT
Start: 2023-04-21 | End: 2023-10-22

## 2023-04-21 RX ORDER — TIZANIDINE 4 MG/1
8 TABLET ORAL NIGHTLY
Qty: 60 TABLET | Refills: 5 | Status: SHIPPED | OUTPATIENT
Start: 2023-04-21 | End: 2023-10-18

## 2023-04-21 RX ORDER — CLINDAMYCIN PHOSPHATE 11.9 MG/ML
30 SOLUTION TOPICAL 2 TIMES DAILY PRN
COMMUNITY
Start: 2023-03-16

## 2023-04-21 RX ORDER — KETOCONAZOLE 20 MG/ML
120 SHAMPOO, SUSPENSION TOPICAL
COMMUNITY
Start: 2023-03-16

## 2023-04-21 RX ORDER — ALBUTEROL SULFATE 90 UG/1
2 AEROSOL, METERED RESPIRATORY (INHALATION) EVERY 6 HOURS PRN
COMMUNITY
Start: 2022-11-04

## 2023-04-21 RX ORDER — PRIMIDONE 50 MG/1
0.5 TABLET ORAL NIGHTLY
COMMUNITY
Start: 2023-02-16

## 2023-04-21 RX ORDER — ERGOCALCIFEROL 1.25 MG/1
50000 CAPSULE ORAL
Qty: 5 CAPSULE | Refills: 5 | Status: SHIPPED | OUTPATIENT
Start: 2023-04-21

## 2023-04-21 RX ORDER — FAMOTIDINE 40 MG/1
40 TABLET, FILM COATED ORAL NIGHTLY
COMMUNITY
Start: 2023-03-16

## 2023-04-21 RX ORDER — FERROUS SULFATE TAB 325 MG (65 MG ELEMENTAL FE) 325 (65 FE) MG
65 TAB ORAL 2 TIMES DAILY
COMMUNITY
Start: 2023-03-28

## 2023-04-21 RX ORDER — PANTOPRAZOLE SODIUM 40 MG/1
40 TABLET, DELAYED RELEASE ORAL DAILY
COMMUNITY
Start: 2022-12-12

## 2023-04-21 RX ORDER — SULFASALAZINE 500 MG/1
1000 TABLET ORAL
Qty: 60 TABLET | Refills: 5 | Status: SHIPPED | OUTPATIENT
Start: 2023-04-21 | End: 2023-10-18

## 2023-04-21 NOTE — ASSESSMENT & PLAN NOTE
Continue Ergocalciferol  50,000 units Q 7 days  Educated on increasing foods high in Vitamin D such as fish oil, cod liver oil, salmon, milk fortified with vitamin D.

## 2023-04-21 NOTE — ASSESSMENT & PLAN NOTE
Avoid caffeine, alcohol and stimulants.  Sleep hygiene refers to actions that tend to improve and maintain good sleep:  Sleep as long as necessary to feel rested (usually seven to eight hours for adults) and then get out of bed  Maintain a regular sleep schedule, particularly a regular wake-up time in the morning  Avoid smoking or other nicotine intake, particularly during the evening

## 2023-04-24 RX ORDER — PREGABALIN 75 MG/1
75 CAPSULE ORAL 2 TIMES DAILY
Qty: 60 CAPSULE | Refills: 5 | Status: SHIPPED | OUTPATIENT
Start: 2023-04-24

## 2023-10-31 ENCOUNTER — OFFICE VISIT (OUTPATIENT)
Dept: RHEUMATOLOGY | Facility: CLINIC | Age: 28
End: 2023-10-31
Payer: COMMERCIAL

## 2023-10-31 VITALS
DIASTOLIC BLOOD PRESSURE: 78 MMHG | OXYGEN SATURATION: 98 % | HEART RATE: 87 BPM | WEIGHT: 199.38 LBS | BODY MASS INDEX: 33.22 KG/M2 | TEMPERATURE: 97 F | HEIGHT: 65 IN | RESPIRATION RATE: 20 BRPM | SYSTOLIC BLOOD PRESSURE: 116 MMHG

## 2023-10-31 DIAGNOSIS — E55.9 VITAMIN D DEFICIENCY: ICD-10-CM

## 2023-10-31 DIAGNOSIS — M25.571 ACUTE RIGHT ANKLE PAIN: ICD-10-CM

## 2023-10-31 DIAGNOSIS — G47.00 INSOMNIA, UNSPECIFIED TYPE: ICD-10-CM

## 2023-10-31 DIAGNOSIS — M79.7 FIBROMYALGIA: ICD-10-CM

## 2023-10-31 DIAGNOSIS — M06.00 SERONEGATIVE RHEUMATOID ARTHRITIS: Primary | ICD-10-CM

## 2023-10-31 PROCEDURE — 1159F PR MEDICATION LIST DOCUMENTED IN MEDICAL RECORD: ICD-10-PCS | Mod: CPTII,S$GLB,,

## 2023-10-31 PROCEDURE — 3074F PR MOST RECENT SYSTOLIC BLOOD PRESSURE < 130 MM HG: ICD-10-PCS | Mod: CPTII,S$GLB,,

## 2023-10-31 PROCEDURE — 3008F BODY MASS INDEX DOCD: CPT | Mod: CPTII,S$GLB,,

## 2023-10-31 PROCEDURE — 99214 OFFICE O/P EST MOD 30 MIN: CPT | Mod: S$GLB,,,

## 2023-10-31 PROCEDURE — 99214 PR OFFICE/OUTPT VISIT, EST, LEVL IV, 30-39 MIN: ICD-10-PCS | Mod: S$GLB,,,

## 2023-10-31 PROCEDURE — 3008F PR BODY MASS INDEX (BMI) DOCUMENTED: ICD-10-PCS | Mod: CPTII,S$GLB,,

## 2023-10-31 PROCEDURE — 3078F DIAST BP <80 MM HG: CPT | Mod: CPTII,S$GLB,,

## 2023-10-31 PROCEDURE — 99999 PR PBB SHADOW E&M-EST. PATIENT-LVL IV: CPT | Mod: PBBFAC,,,

## 2023-10-31 PROCEDURE — 3074F SYST BP LT 130 MM HG: CPT | Mod: CPTII,S$GLB,,

## 2023-10-31 PROCEDURE — 1159F MED LIST DOCD IN RCRD: CPT | Mod: CPTII,S$GLB,,

## 2023-10-31 PROCEDURE — 99999 PR PBB SHADOW E&M-EST. PATIENT-LVL IV: ICD-10-PCS | Mod: PBBFAC,,,

## 2023-10-31 PROCEDURE — 3078F PR MOST RECENT DIASTOLIC BLOOD PRESSURE < 80 MM HG: ICD-10-PCS | Mod: CPTII,S$GLB,,

## 2023-10-31 RX ORDER — PREDNISONE 5 MG/1
5 TABLET ORAL DAILY PRN
Qty: 30 TABLET | Refills: 5 | Status: SHIPPED | OUTPATIENT
Start: 2023-10-31

## 2023-10-31 RX ORDER — TIZANIDINE 4 MG/1
8 TABLET ORAL NIGHTLY
Qty: 60 TABLET | Refills: 5 | Status: SHIPPED | OUTPATIENT
Start: 2023-10-31

## 2023-10-31 RX ORDER — HYDROXYCHLOROQUINE SULFATE 200 MG/1
200 TABLET, FILM COATED ORAL 2 TIMES DAILY
Qty: 60 TABLET | Refills: 5 | Status: SHIPPED | OUTPATIENT
Start: 2023-10-31 | End: 2024-05-02

## 2023-10-31 RX ORDER — SULFASALAZINE 500 MG/1
1000 TABLET ORAL 2 TIMES DAILY
Qty: 120 TABLET | Refills: 5 | Status: SHIPPED | OUTPATIENT
Start: 2023-10-31 | End: 2024-04-28

## 2023-10-31 NOTE — ASSESSMENT & PLAN NOTE
She is in flare up. She has prolonged morning stiffness and joint tenderness on exam.  Last office visit was April 2023 and she was initiated on sulfasalazine.  She has appointment with fertility specialist in his trying to have a baby.    Continue hydroxychloroquine 200 mg b.i.d.  Increase Sulfasalzine 1000mg BID      Plaquenil Eye Exam: [facility] March 2023      Labs completed 8/21/23 with PCP- CMP, CBC  WNL. (patient had lab results with her to view)  reviewed with patient today during today's visist    Did discuss the potential future biologic treatments such as Cimzia.

## 2023-10-31 NOTE — ASSESSMENT & PLAN NOTE
Numbness and Tingling daily in bilateral feet.   Continue Lyrica  75 mg BID  Continue tizanidine 8 mg daily

## 2023-10-31 NOTE — PROGRESS NOTES
Subjective:           Patient ID: Beverley Melendez is a 27 y.o. female.    Chief Complaint: Pain (The patient states that her hands and feet swell. She has a sprain ankle x 2 weeks. )      Ms. Melendez is a 27-year-old female here for follow-up.  She was initially referred by Dr. Zofia Rojas for polyarthropathy.  She established care with Dr. Young on 10/13/2022. Past labs: MAYE Neg, c-ANCA, p-ANCA neg, RF Neg, CCP neg, HLA-B27 negative. She was initiated on Hydroxychloroquine.  She is of childbearing age and trying to have a baby. She reports daily numbness/tingling in bilateral feet. Followed by Cardiology, Sinus A flutter, Followed by [facility]- [facility]. Followed by neurologist- Dr. Ayala Tremors in hand and migraines. COVID Booster and Flu shot in the past week. She is planning to go to the fertility doctor next month. She is currently in physical therapy for your hip d/t recent tear of right acetabular labrum.  Also reports recent ER visit for right ankle, records are not available. She reports Xray normal and was told it was sprained. She is accompanied by a significant other today.    Today The patient is complaining of joint pain involving the MCP PIP wrist elbow shoulders hips knees and ankles bilaterally.  The pain is 7/10 in intensity dull in quality and continuous.  That is associated with a morning stiffness lasting for more than 60 minutes.  IThis has been associated with myalgias.  Muscle aches are 6/10 in intensity dull in quality and continuous.  They are associated with fatigue.  Denies fever and chills.           Review of Systems   Constitutional:  Positive for fatigue. Negative for appetite change, chills and fever.   HENT:  Negative for congestion, ear pain, mouth sores, nosebleeds and trouble swallowing.    Eyes:  Negative for photophobia and discharge.   Respiratory:  Negative for chest tightness and shortness of breath.    Cardiovascular:  Negative for chest pain.   Gastrointestinal:  Negative for  "abdominal pain and vomiting.   Endocrine: Negative.    Genitourinary:  Negative for hematuria.   Musculoskeletal:  Positive for arthralgias, joint swelling and myalgias.        As per HPI  R ankle   Skin:  Negative for rash.   Neurological:  Negative for weakness.         Objective:   /78 (BP Location: Left arm, Patient Position: Sitting, BP Method: Medium (Automatic))   Pulse 87   Temp 96.5 °F (35.8 °C) (Temporal)   Resp 20   Ht 5' 5" (1.651 m)   Wt 90.4 kg (199 lb 6.4 oz)   SpO2 98%   BMI 33.18 kg/m²          Physical Exam   Constitutional: She is oriented to person, place, and time. She appears well-developed and well-nourished. No distress.   HENT:   Head: Normocephalic and atraumatic.   Right Ear: External ear normal.   Left Ear: External ear normal.   Eyes: Pupils are equal, round, and reactive to light.   Cardiovascular: Normal rate.   Pulmonary/Chest: Effort normal.   Abdominal: Soft. There is no abdominal tenderness.   Musculoskeletal:      Right shoulder: Tenderness present.      Left shoulder: Tenderness present.      Right elbow: Tenderness present.      Left elbow: Tenderness present.      Right wrist: Tenderness present.      Left wrist: Tenderness present.      Cervical back: Neck supple.      Right hip: Tenderness present.      Left hip: Tenderness present.      Right knee: Tenderness present.      Left knee: Tenderness present.      Right ankle: Tenderness present.      Left ankle: Tenderness present.   Lymphadenopathy:     She has no cervical adenopathy.   Neurological: She is alert and oriented to person, place, and time. She displays normal reflexes. No cranial nerve deficit or sensory deficit. She exhibits normal muscle tone. Coordination normal.   Skin: No rash noted. No erythema.   Vitals reviewed.      Right Side Rheumatological Exam     The patient is tender to palpation of the shoulder, elbow, wrist, knee, 1st PIP, 1st MCP, 2nd PIP, 2nd MCP, 3rd PIP, 3rd MCP, 4th PIP, 4th MCP, " 5th PIP, hip, ankle, 1st MTP, 2nd MTP, 3rd MTP, 4th MTP, 5th MTP, 1st toe IP, 2nd toe IP, 3rd toe IP, 4th toe IP and 5th toe IP    Left Side Rheumatological Exam     The patient is tender to palpation of the shoulder, elbow, wrist, knee, 1st PIP, 1st MCP, 2nd PIP, 2nd MCP, 3rd PIP, 3rd MCP, 4th PIP, 4th MCP, 5th PIP, 5th MCP, hip, ankle, 1st MTP, 2nd MTP, 3rd MTP, 4th MTP, 5th MTP, 1st toe IP, 2nd toe IP, 3rd toe IP, 4th toe IP and 5th toe IP.           Completed Fibromyalgia exam 18/18 tender points.    No data to display     Assessment:         Medication List with Changes/Refills   New Medications    HYDROXYCHLOROQUINE (PLAQUENIL) 200 MG TABLET    Take 1 tablet (200 mg total) by mouth 2 (two) times daily. After food       Start Date: 10/31/2023End Date: 5/2/2024    PREDNISONE (DELTASONE) 5 MG TABLET    Take 1 tablet (5 mg total) by mouth daily as needed (for flare up).       Start Date: 10/31/2023End Date: --    SULFASALAZINE (AZULFIDINE) 500 MG TAB    Take 2 tablets (1,000 mg total) by mouth 2 (two) times a day.       Start Date: 10/31/2023End Date: 4/28/2024    TIZANIDINE (ZANAFLEX) 4 MG TABLET    Take 2 tablets (8 mg total) by mouth every evening.       Start Date: 10/31/2023End Date: --   Current Medications    BUDESONIDE-FORMOTEROL 80-4.5 MCG (SYMBICORT) 80-4.5 MCG/ACTUATION HFAA    Inhale 2 puffs into the lungs 2 (two) times a day.       Start Date: 7/5/2020  End Date: --    CLINDAMYCIN (CLEOCIN T) 1 % EXTERNAL SOLUTION    Apply 30 mLs topically 2 (two) times daily as needed.       Start Date: 3/16/2023 End Date: --    CLOBETASOL (TEMOVATE) 0.05 % CREAM    Apply 45 g topically 2 (two) times daily.       Start Date: 3/23/2023 End Date: --    CYANOCOBALAMIN (VITAMIN B-12) 1000 MCG TABLET    Take 100 mcg by mouth once daily.       Start Date: --        End Date: --    EPINEPHRINE (EPIPEN) 0.3 MG/0.3 ML ATIN           Start Date: 7/26/2022 End Date: --    ERGOCALCIFEROL (ERGOCALCIFEROL) 50,000 UNIT CAP    Take  1 capsule (50,000 Units total) by mouth every 7 days.       Start Date: 4/21/2023 End Date: --    FAMOTIDINE (PEPCID) 40 MG TABLET    Take 40 mg by mouth every evening.       Start Date: 3/16/2023 End Date: --    FEROSUL 325 MG (65 MG IRON) TAB TABLET    Take 65 mg by mouth 2 (two) times daily.       Start Date: 3/28/2023 End Date: --    KETOCONAZOLE (NIZORAL) 2 % SHAMPOO    Apply 120 mLs topically twice a week.       Start Date: 3/16/2023 End Date: --    LABETALOL (NORMODYNE) 100 MG TABLET    Take 100 mg by mouth 2 (two) times a day.       Start Date: 7/9/2022  End Date: --    LUBIPROSTONE (AMITIZA) 24 MCG CAP    Take 24 mcg by mouth 2 (two) times a day.       Start Date: 10/3/2022 End Date: --    NALTREXONE HCL, BULK, MISC    Take 1.5 mg by mouth every evening.       Start Date: 12/23/2022End Date: --    ONDANSETRON (ZOFRAN) 8 MG TABLET    Take 8 mg by mouth every 8 (eight) hours as needed.       Start Date: 10/11/2022End Date: --    PANTOPRAZOLE (PROTONIX) 40 MG TABLET    Take 40 mg by mouth once daily.       Start Date: 12/12/2022End Date: --    PREGABALIN (LYRICA) 75 MG CAPSULE    Take 1 capsule (75 mg total) by mouth 2 (two) times daily.       Start Date: 4/24/2023 End Date: --    PRIMIDONE (MYSOLINE) 50 MG TAB    Take 0.5 mg by mouth every evening.       Start Date: 2/16/2023 End Date: --    TRANEXAMIC ACID (LYSTEDA) 650 MG TABLET    Take 1,300 mg by mouth 3 (three) times daily.       Start Date: 3/23/2023 End Date: --    VENTOLIN HFA 90 MCG/ACTUATION INHALER    Inhale 2 puffs into the lungs every 6 (six) hours as needed.       Start Date: 11/4/2022 End Date: --         ICD-10-CM ICD-9-CM   1. Seronegative rheumatoid arthritis  M06.00 714.0   2. Fibromyalgia  M79.7 729.1   3. Insomnia, unspecified type  G47.00 780.52   4. Acute right ankle pain  M25.571 719.47     338.19   5. Vitamin D deficiency  E55.9 268.9           Plan:       1. Seronegative rheumatoid arthritis  Overview:  Past labs: MAYE Neg, c-ANCA,  p-ANCA neg, RF Neg, CCP neg, HLA-B27 negative.  Note to use pregnancy safe medications. Childbearing age and trying to have a baby-     Assessment & Plan:  She is in flare up. She has prolonged morning stiffness and joint tenderness on exam.  Last office visit was April 2023 and she was initiated on sulfasalazine.  She has appointment with fertility specialist in his trying to have a baby.    Continue hydroxychloroquine 200 mg b.i.d.  Increase Sulfasalzine 1000mg BID      Plaquenil Eye Exam: [facility] March 2023      Labs completed 8/21/23 with PCP- CMP, CBC  WNL. (patient had lab results with her to view)  reviewed with patient today during today's visist    Did discuss the potential future biologic treatments such as Cimzia.     Orders:  -     sulfaSALAzine (AZULFIDINE) 500 mg Tab; Take 2 tablets (1,000 mg total) by mouth 2 (two) times a day.  Dispense: 120 tablet; Refill: 5  -     hydroxychloroquine (PLAQUENIL) 200 mg tablet; Take 1 tablet (200 mg total) by mouth 2 (two) times daily. After food  Dispense: 60 tablet; Refill: 5  -     predniSONE (DELTASONE) 5 MG tablet; Take 1 tablet (5 mg total) by mouth daily as needed (for flare up).  Dispense: 30 tablet; Refill: 5    2. Fibromyalgia  Assessment & Plan:  Numbness and Tingling daily in bilateral feet.   Continue Lyrica  75 mg BID  Continue tizanidine 8 mg daily    Orders:  -     tiZANidine (ZANAFLEX) 4 MG tablet; Take 2 tablets (8 mg total) by mouth every evening.  Dispense: 60 tablet; Refill: 5    3. Insomnia, unspecified type  Assessment & Plan:  Avoid caffeine, alcohol and stimulants.  Power down electronic devices at least one hour prior to bedtime.  Keep room dark; use eye mask or relaxation sound machine to promote rest.  Sleep hygiene refers to actions that tend to improve and maintain good sleep:  Sleep as long as necessary to feel rested (usually seven to eight hours for adults) and then get out of bed  Maintain a regular sleep schedule,  particularly a regular wake-up time in the morning  Avoid smoking or other nicotine intake, particularly during the evening          4. Acute right ankle pain  Assessment & Plan:  Recent ER visit- Xray normal- was told it was sprained  Currently in physical therapy for R hip tear but is temporarily stopped this week d/t ankle        5. Vitamin D deficiency  Assessment & Plan:  Last Vitamin D Level:  18 on 8/21/23 (labs w/ PCP)    Continue Cholecalciferol Vitamin D3 50,000 units Q 7 days  Educated on increasing foods high in Vitamin D such as fish oil, cod liver oil, salmon, milk fortified with vitamin D.

## 2023-10-31 NOTE — ASSESSMENT & PLAN NOTE
Recent ER visit- Xray normal- was told it was sprained  Currently in physical therapy for R hip tear but is temporarily stopped this week d/t ankle

## 2023-10-31 NOTE — ASSESSMENT & PLAN NOTE
Last Vitamin D Level:  18 on 8/21/23 (labs w/ PCP)    Continue Cholecalciferol Vitamin D3 50,000 units Q 7 days  Educated on increasing foods high in Vitamin D such as fish oil, cod liver oil, salmon, milk fortified with vitamin D.